# Patient Record
Sex: MALE | Race: WHITE | NOT HISPANIC OR LATINO | URBAN - METROPOLITAN AREA
[De-identification: names, ages, dates, MRNs, and addresses within clinical notes are randomized per-mention and may not be internally consistent; named-entity substitution may affect disease eponyms.]

---

## 2017-10-28 ENCOUNTER — EMERGENCY (EMERGENCY)
Facility: HOSPITAL | Age: 32
LOS: 1 days | Discharge: HOME | End: 2017-10-28
Attending: EMERGENCY MEDICINE | Admitting: EMERGENCY MEDICINE
Payer: COMMERCIAL

## 2017-10-28 VITALS
HEART RATE: 106 BPM | OXYGEN SATURATION: 98 % | DIASTOLIC BLOOD PRESSURE: 90 MMHG | TEMPERATURE: 98 F | SYSTOLIC BLOOD PRESSURE: 130 MMHG | RESPIRATION RATE: 18 BRPM

## 2017-10-28 PROCEDURE — 99284 EMERGENCY DEPT VISIT MOD MDM: CPT
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
## 2017-10-28 NOTE — ED PROVIDER NOTE - PLAN OF CARE
Rest, ice,   Take Motrin 600mg every 8 hrs with food for pain.  Follow up with PMD within 48-72 hrs.  Any worsening pain, swelling, weakness, numbness return to ED. Ortho referral list provided if needed.
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
## 2018-06-19 PROBLEM — Z00.00 ENCOUNTER FOR PREVENTIVE HEALTH EXAMINATION: Status: ACTIVE | Noted: 2018-06-19

## 2018-06-20 ENCOUNTER — APPOINTMENT (OUTPATIENT)
Dept: ULTRASOUND IMAGING | Facility: CLINIC | Age: 33
End: 2018-06-20
Payer: COMMERCIAL

## 2018-06-20 ENCOUNTER — TRANSCRIPTION ENCOUNTER (OUTPATIENT)
Age: 33
End: 2018-06-20

## 2018-06-20 ENCOUNTER — OUTPATIENT (OUTPATIENT)
Dept: OUTPATIENT SERVICES | Facility: HOSPITAL | Age: 33
LOS: 1 days | End: 2018-06-20
Payer: COMMERCIAL

## 2018-06-20 DIAGNOSIS — Z00.8 ENCOUNTER FOR OTHER GENERAL EXAMINATION: ICD-10-CM

## 2018-06-20 PROCEDURE — 76700 US EXAM ABDOM COMPLETE: CPT | Mod: 26

## 2018-06-20 PROCEDURE — 76700 US EXAM ABDOM COMPLETE: CPT

## 2019-04-19 ENCOUNTER — EMERGENCY (EMERGENCY)
Facility: HOSPITAL | Age: 34
LOS: 1 days | Discharge: HOME | End: 2019-04-19
Admitting: EMERGENCY MEDICINE
Payer: COMMERCIAL

## 2019-04-19 VITALS
DIASTOLIC BLOOD PRESSURE: 84 MMHG | RESPIRATION RATE: 16 BRPM | HEART RATE: 68 BPM | TEMPERATURE: 98 F | OXYGEN SATURATION: 100 % | SYSTOLIC BLOOD PRESSURE: 144 MMHG

## 2019-04-19 PROCEDURE — 99283 EMERGENCY DEPT VISIT LOW MDM: CPT | Mod: 25

## 2019-04-19 PROCEDURE — 12001 RPR S/N/AX/GEN/TRNK 2.5CM/<: CPT | Mod: RT

## 2019-04-19 RX ORDER — TETANUS TOXOID, REDUCED DIPHTHERIA TOXOID AND ACELLULAR PERTUSSIS VACCINE, ADSORBED 5; 2.5; 8; 8; 2.5 [IU]/.5ML; [IU]/.5ML; UG/.5ML; UG/.5ML; UG/.5ML
0.5 SUSPENSION INTRAMUSCULAR ONCE
Qty: 0 | Refills: 0 | Status: COMPLETED | OUTPATIENT
Start: 2019-04-19 | End: 2019-04-19

## 2019-04-19 RX ADMIN — TETANUS TOXOID, REDUCED DIPHTHERIA TOXOID AND ACELLULAR PERTUSSIS VACCINE, ADSORBED 0.5 MILLILITER(S): 5; 2.5; 8; 8; 2.5 SUSPENSION INTRAMUSCULAR at 10:53

## 2019-04-19 NOTE — ED PROVIDER NOTE - PLAN OF CARE
Follow up with your PMD within 48-72 hours for wound check. Keep sutures covered and dry for 24 hours then clean with soap and tepid water daily.  Apply bacitracin and cover.  Return to ED for suture removal in 7 days. Please return immediately to the Emergency Department if you have any worsening or change in your condition or if you have any other problems or concerns at all, including but not limited to any increased pain, redness, streaking (red lines), swelling, fever, chills.

## 2019-04-19 NOTE — ED PROVIDER NOTE - SKIN WOUND TYPE
+abrasion ~ 7 cm at right anterior shin; no surrounding redness; no warmth; no purulent drainage; no induration; no crepitus; no bony defects; no point tenderness; no active drainage/LACERATION(S)

## 2019-04-19 NOTE — ED PROVIDER NOTE - OBJECTIVE STATEMENT
Pt is a 32 y/o M nonsmoker no PMHx p/w laceration 10 hrs ago.  Pt states pt fell while standing on stool, pt's fell down, landing on feet, however, striking right anterior shin against stool resulting in abrasion and laceration at right anterior shin.  Pt noted moderate to severe aching pain at site of wound.  Pt cannot recall last tetanus shot.  Pt denies any headache, head trauma, neck pain, back pain, inability to stand or walk.  Bleeding resolved at this stime.

## 2019-04-19 NOTE — ED PROVIDER NOTE - CLINICAL SUMMARY MEDICAL DECISION MAKING FREE TEXT BOX
Pt is a 32 y/o M nonsmoker no PMHx p/w laceration 10 hrs ago -- laceration and abrasion; not clinically concerning for fracture -- lac repair, wound care

## 2020-01-07 ENCOUNTER — TRANSCRIPTION ENCOUNTER (OUTPATIENT)
Age: 35
End: 2020-01-07

## 2020-08-25 ENCOUNTER — APPOINTMENT (OUTPATIENT)
Dept: HUMAN REPRODUCTION | Facility: CLINIC | Age: 35
End: 2020-08-25
Payer: COMMERCIAL

## 2020-08-25 PROCEDURE — 89322 SEMEN ANAL STRICT CRITERIA: CPT

## 2021-12-01 ENCOUNTER — TRANSCRIPTION ENCOUNTER (OUTPATIENT)
Age: 36
End: 2021-12-01

## 2021-12-02 ENCOUNTER — OUTPATIENT (OUTPATIENT)
Dept: INPATIENT UNIT | Facility: HOSPITAL | Age: 36
LOS: 1 days | Discharge: HOME | End: 2021-12-02

## 2021-12-02 ENCOUNTER — APPOINTMENT (OUTPATIENT)
Age: 36
End: 2021-12-02

## 2021-12-02 VITALS
HEART RATE: 77 BPM | RESPIRATION RATE: 16 BRPM | SYSTOLIC BLOOD PRESSURE: 142 MMHG | TEMPERATURE: 98 F | OXYGEN SATURATION: 97 % | DIASTOLIC BLOOD PRESSURE: 86 MMHG

## 2021-12-02 VITALS
SYSTOLIC BLOOD PRESSURE: 127 MMHG | HEART RATE: 88 BPM | TEMPERATURE: 98 F | RESPIRATION RATE: 16 BRPM | OXYGEN SATURATION: 97 % | DIASTOLIC BLOOD PRESSURE: 92 MMHG

## 2021-12-02 DIAGNOSIS — U07.1 COVID-19: ICD-10-CM

## 2021-12-02 RX ORDER — DIPHENHYDRAMINE HCL 50 MG
25 CAPSULE ORAL ONCE
Refills: 0 | Status: COMPLETED | OUTPATIENT
Start: 2021-12-02 | End: 2021-12-02

## 2021-12-02 RX ORDER — SODIUM CHLORIDE 9 MG/ML
250 INJECTION INTRAMUSCULAR; INTRAVENOUS; SUBCUTANEOUS
Refills: 0 | Status: COMPLETED | OUTPATIENT
Start: 2021-12-02 | End: 2021-12-02

## 2021-12-02 RX ADMIN — SODIUM CHLORIDE 310 MILLILITER(S): 9 INJECTION INTRAMUSCULAR; INTRAVENOUS; SUBCUTANEOUS at 13:15

## 2021-12-02 RX ADMIN — Medication 25 MILLIGRAM(S): at 12:15

## 2021-12-02 NOTE — CHART NOTE - NSCHARTNOTEFT_GEN_A_CORE
Medicine Progress Note    Patient is a 35y old  Male who presents with a chief complaint of covid 19 infection and monoclonal antibody infusion casirivimab and imdevimab. pt states he has been having congestion, cough, fever, loss of taste and smell x 2 days. tested positive for covid yesterday. pt has been vaccinated with moderna in april.    MEDICATIONS  (STANDING):  casirivimab/imdevimab in sodium chloride 0.9% (EUA) IVPB 100 milliLiter(s) IV Intermittent once  sodium chloride 0.9%. 250 milliLiter(s) (310 mL/Hr) IV Continuous <Continuous>    MEDICATIONS  (PRN):  diphenhydrAMINE 25 milliGRAM(s) Oral once PRN Rash and/or Itching    PHYSICAL EXAM:  Vital Signs Last 24 Hrs  T(C): --  T(F): --  HR: --  BP: --  BP(mean): --  RR: --  SpO2: --    CONSTITUTIONAL: NAD, well-developed, well-groomed  RESPIRATORY: Normal respiratory effort; lungs are clear to auscultation bilaterally  CARDIOVASCULAR: Regular rate and rhythm, normal S1 and S2, no murmur/rub/gallop; No lower extremity edema; Peripheral pulses are 2+ bilaterally  PSYCH: A+O to person, place, and time; affect appropriate  NEUROLOGY: CN 2-12 are intact and symmetric; no gross sensory deficits   SKIN: No rashes; no palpable lesions    Plan:  I have reviewed the Casirivimab/Imdevimab Emergency Use Authorization (EUA) and I have provided the patient or patient's caregiver with the following information:  1. FDA has authorized emergency use of Casirivimab/Imdevimab which is not an FDA approved biological agent.  2. The patient or patient’s caregiver has the option to accept or refuse administration of Casirivimab/Imdevimab.  3. The significant known and potential risks and benefits of Casirivimab/Imdevimab and the extent to which such risks and benefits are unknown.  4. Information on available alternative treatments and risks and benefits of those alternatives.  Informed consent was obtained. Answered all of patient's questions and concerns to their satisfaction. Patient verbalized understanding.  Monitor VS per protocol.  Insert peripheral IV.  Infuse NSS 25/mL IV continuously.  Administer casirivimab/imdevimab IV over 1 hour.  Observe patient for 1 hour post infusion.    Disposition:  Patient tolerated infusion well, denies complaints of chest pain, shortness of breath, dizziness or palpitations. Discharge instructions given and fact sheet included.  Instructed patient to contact the call center or their PMD if they develop side effects at home.  Instructed the patient to call 911 and go to the emergency room if they develop symptoms of a severe allergic reaction. Patient verbalized understanding.  RN educated patient on the proper use of the incentive spirometer and the patient displayed return demonstration.  VSS and RN removed IV successfully.  Patient was discharged home in [facility]. Medicine Progress Note    Patient is a 35y old  Male who presents with a chief complaint of covid 19 infection and monoclonal antibody infusion casirivimab and imdevimab. pt states he has been having congestion, cough, fever, loss of taste and smell x 2 days. tested positive for covid yesterday. pt has been vaccinated with moderna in april.    MEDICATIONS  (STANDING):  casirivimab/imdevimab in sodium chloride 0.9% (EUA) IVPB 100 milliLiter(s) IV Intermittent once  sodium chloride 0.9%. 250 milliLiter(s) (310 mL/Hr) IV Continuous <Continuous>    MEDICATIONS  (PRN):  diphenhydrAMINE 25 milliGRAM(s) Oral once PRN Rash and/or Itching    PHYSICAL EXAM:  Vital Signs Last 24 Hrs  T(C): 36.7 (02 Dec 2021 13:15), Max: 37.1 (02 Dec 2021 12:15)  T(F): 98.1 (02 Dec 2021 13:15), Max: 98.7 (02 Dec 2021 12:15)  HR: 88 (02 Dec 2021 13:15) (77 - 88)  BP: 127/92 (02 Dec 2021 13:15) (125/86 - 142/86)  BP(mean): --  RR: 16 (02 Dec 2021 13:15) (16 - 16)  SpO2: 97% (02 Dec 2021 13:15) (97% - 97%)    CONSTITUTIONAL: NAD, well-developed, well-groomed  RESPIRATORY: Normal respiratory effort; lungs are clear to auscultation bilaterally  CARDIOVASCULAR: Regular rate and rhythm, normal S1 and S2, no murmur/rub/gallop; No lower extremity edema; Peripheral pulses are 2+ bilaterally  PSYCH: A+O to person, place, and time; affect appropriate  NEUROLOGY: CN 2-12 are intact and symmetric; no gross sensory deficits   SKIN: No rashes; no palpable lesions    Plan:  I have reviewed the Casirivimab/Imdevimab Emergency Use Authorization (EUA) and I have provided the patient or patient's caregiver with the following information:  1. FDA has authorized emergency use of Casirivimab/Imdevimab which is not an FDA approved biological agent.  2. The patient or patient’s caregiver has the option to accept or refuse administration of Casirivimab/Imdevimab.  3. The significant known and potential risks and benefits of Casirivimab/Imdevimab and the extent to which such risks and benefits are unknown.  4. Information on available alternative treatments and risks and benefits of those alternatives.  Informed consent was obtained. Answered all of patient's questions and concerns to their satisfaction. Patient verbalized understanding.  Monitor VS per protocol.  Insert peripheral IV.  Infuse NSS 25/mL IV continuously.  Administer casirivimab/imdevimab IV over 1 hour.  Observe patient for 1 hour post infusion.    Disposition:  Patient tolerated infusion well, denies complaints of chest pain, shortness of breath, dizziness or palpitations. Discharge instructions given and fact sheet included.  Instructed patient to contact the call center or their PMD if they develop side effects at home.  Instructed the patient to call 911 and go to the emergency room if they develop symptoms of a severe allergic reaction. Patient verbalized understanding.  RN educated patient on the proper use of the incentive spirometer and the patient displayed return demonstration.  VSS and RN removed IV successfully.  Patient was discharged home in [facility].

## 2021-12-03 ENCOUNTER — TRANSCRIPTION ENCOUNTER (OUTPATIENT)
Age: 36
End: 2021-12-03

## 2021-12-06 ENCOUNTER — TRANSCRIPTION ENCOUNTER (OUTPATIENT)
Age: 36
End: 2021-12-06

## 2022-04-13 ENCOUNTER — NON-APPOINTMENT (OUTPATIENT)
Age: 37
End: 2022-04-13

## 2022-04-14 ENCOUNTER — APPOINTMENT (OUTPATIENT)
Dept: ORTHOPEDIC SURGERY | Facility: CLINIC | Age: 37
End: 2022-04-14

## 2022-04-18 ENCOUNTER — NON-APPOINTMENT (OUTPATIENT)
Age: 37
End: 2022-04-18

## 2022-04-18 ENCOUNTER — APPOINTMENT (OUTPATIENT)
Dept: ORTHOPEDIC SURGERY | Facility: CLINIC | Age: 37
End: 2022-04-18
Payer: COMMERCIAL

## 2022-04-18 DIAGNOSIS — M25.872 OTHER SPECIFIED JOINT DISORDERS, LEFT ANKLE AND FOOT: ICD-10-CM

## 2022-04-18 PROCEDURE — 99203 OFFICE O/P NEW LOW 30 MIN: CPT

## 2022-04-18 PROCEDURE — 73610 X-RAY EXAM OF ANKLE: CPT | Mod: LT

## 2022-04-22 PROBLEM — M25.872 IMPINGEMENT OF LEFT ANKLE JOINT: Status: ACTIVE | Noted: 2022-04-22
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
## 2022-04-22 NOTE — DISCUSSION/SUMMARY
[de-identified] : 37 y/o male with left ankle impingement\par \par Patient presents for evaluation of left ankle pain s/p inversion injury.  Patient reports multiple prior sprains, and discussion was had with the patient regarding findings on x-ray imaging including anterior talus bone spur and overhang of the tibial plafond and consistent with anterior impingement. We discussed conservative management with trial of PT and supportive care.  However, we discussed consideration of follow-up with foot and ankle orthopedic for possible surgical intervention.\par \par Recommendation: Begin trial of PT, Rx given. Conservative care & observation, this includes rest/activity avoidance until less symptomatic with subsequent gradual return to full activity as tolerated. Patient may also use OTC NSAID's or acetaminophen as tolerated, with application of ice to the area 2-3x daily for 20 minutes after periods of activity. \par \par Follow up as needed.

## 2022-04-22 NOTE — ADDENDUM
[FreeTextEntry1] : This note was written by Jade Oglesby on 04/18/2022 acting solely as a scribe for Dr. Deepak Rogel.\par \par All medical record entries made by the Scribe were at my, Dr. Deepak Rogel, direction and personally dictated by me on 04/18/2022. I have personally reviewed the chart and agree that the record accurately reflects my personal performance of the history, physical exam, assessment and plan.

## 2022-04-22 NOTE — PHYSICAL EXAM
[de-identified] : Oriented to time, place, person\par Mood: Normal\par Affect: Normal\par Appearance: Healthy, well appearing, no acute distress.\par Gait: Normal\par Assistive Devices: None\par \par Left Ankle exam:\par \par Skin: Clean, dry, intact\par Inspection: No obvious malalignment, no swelling, no effusion\par Pulses: 2+ DP/PT pulses \par ROM: normal degrees of dorsiflexion, normal degrees of plantarflexion, normal subtalar motion.+Pain with forced DF\par Tenderness: No tenderness over the lateral malleolus, no CFL/ATFL/PTFL pain. No medial malleolus pain, no deltoid ligament pain. No proximal fibular pain. No heel pain.\par Stability: Negative anterior drawer, negative posterior drawer.\par Strength: 5/5 TA/GS/EHL 5/5 inversion/eversion\par Neuro: In tact to light touch throughout\par Additional tests: Negative syndesmosis squeeze test.  [de-identified] : The following radiographs were ordered and read by me during this patients visit. I reviewed each radiograph in detail with the patient and discussed the findings as highlighted below. \par \par 3 views of the left ankle were obtained today, 04/18/2022, that show no acute fracture or dislocation. There is no significant degenerative change seen. There is no gross malalignment. Ankle mortise is intact. There is anterior talus bone spur consistent with anterior impingement.

## 2025-06-16 NOTE — ED PROVIDER NOTE - CARE PLAN
Principal Discharge DX:	Laceration of right lower leg, initial encounter  Assessment and plan of treatment:	Follow up with your PMD within 48-72 hours for wound check. Keep sutures covered and dry for 24 hours then clean with soap and tepid water daily.  Apply bacitracin and cover.  Return to ED for suture removal in 7 days. Please return immediately to the Emergency Department if you have any worsening or change in your condition or if you have any other problems or concerns at all, including but not limited to any increased pain, redness, streaking (red lines), swelling, fever, chills. No